# Patient Record
(demographics unavailable — no encounter records)

---

## 2024-10-24 NOTE — HISTORY OF PRESENT ILLNESS
[Home] : at home, [unfilled] , at the time of the visit. [Medical Office: ([facility])___] : at the medical office located in  [Verbal consent obtained from patient] : the patient, [unfilled] [FreeTextEntry1] : Patient is an 90 y/o F with HTN, HL, achalasia s/p Heller myotomy, Sick-Sinus Syndrome s/p PPM, syncope (in April 2020), spinal stenosis s/p laminectomy, admission to [facility] for hypertensive emergency back in 12/2023, d/c yesterday for le swelling, is presenting for followup via telephone  Reviewed labs in detail, excellent stability. Reports she never noticed edema, eating and drinking well, BPs stable 110-130s, no headache or other symptoms. Did lower telmisartan to 40 as had some dizziness but BPs still controlled. Reviewed hospitalization in detail, likely stasis given not walking or moving as much, needs to elevate legs, does not want to wear compression stockings despite discussion on how it would help but will elevate legs and exercise more.   Current timing Carvedilol about 8am Telmisartan 7pm Carvedilol 8pm Atorvastatin before bed  Not taking any urena.  Continue current meds, monitor as is with labs prior to next televisit. Patient agreeable with positive teachback